# Patient Record
Sex: FEMALE | Race: WHITE | NOT HISPANIC OR LATINO | Employment: FULL TIME | URBAN - METROPOLITAN AREA
[De-identification: names, ages, dates, MRNs, and addresses within clinical notes are randomized per-mention and may not be internally consistent; named-entity substitution may affect disease eponyms.]

---

## 2020-07-17 PROCEDURE — U0003 INFECTIOUS AGENT DETECTION BY NUCLEIC ACID (DNA OR RNA); SEVERE ACUTE RESPIRATORY SYNDROME CORONAVIRUS 2 (SARS-COV-2) (CORONAVIRUS DISEASE [COVID-19]), AMPLIFIED PROBE TECHNIQUE, MAKING USE OF HIGH THROUGHPUT TECHNOLOGIES AS DESCRIBED BY CMS-2020-01-R: HCPCS | Performed by: NURSE PRACTITIONER

## 2020-07-20 ENCOUNTER — TELEPHONE (OUTPATIENT)
Dept: URGENT CARE | Facility: CLINIC | Age: 30
End: 2020-07-20

## 2020-07-20 NOTE — TELEPHONE ENCOUNTER
Results reviewed with MILADY Ceballos. Pt called and given negative covid result. Pt states she is feeling some better. Advised pt that CDC recommends 10 day home quarantine from onset of symptoms. LEISA

## 2020-09-04 ENCOUNTER — TELEPHONE (OUTPATIENT)
Dept: OBSTETRICS AND GYNECOLOGY | Facility: CLINIC | Age: 30
End: 2020-09-04

## 2020-09-04 NOTE — TELEPHONE ENCOUNTER
Patient'ds  dain called stating Michelle had cell free DNA testing done and is wanting us to check to see if the results are in and to call Michelle

## 2020-09-08 ENCOUNTER — ROUTINE PRENATAL (OUTPATIENT)
Dept: OBSTETRICS AND GYNECOLOGY | Facility: CLINIC | Age: 30
End: 2020-09-08

## 2020-09-08 VITALS — BODY MASS INDEX: 23.08 KG/M2 | DIASTOLIC BLOOD PRESSURE: 60 MMHG | WEIGHT: 143 LBS | SYSTOLIC BLOOD PRESSURE: 104 MMHG

## 2020-09-08 DIAGNOSIS — F10.21 HISTORY OF ALCOHOLISM (HCC): ICD-10-CM

## 2020-09-08 DIAGNOSIS — G25.81 RLS (RESTLESS LEGS SYNDROME): ICD-10-CM

## 2020-09-08 DIAGNOSIS — Z3A.11 11 WEEKS GESTATION OF PREGNANCY: Primary | ICD-10-CM

## 2020-09-08 LAB
GLUCOSE UR STRIP-MCNC: NEGATIVE MG/DL
PROT UR STRIP-MCNC: NEGATIVE MG/DL

## 2020-09-08 PROCEDURE — 99213 OFFICE O/P EST LOW 20 MIN: CPT | Performed by: OBSTETRICS & GYNECOLOGY

## 2020-09-08 RX ORDER — FAMOTIDINE 20 MG/1
20 TABLET, FILM COATED ORAL 2 TIMES DAILY
COMMUNITY
Start: 2020-06-10 | End: 2020-09-10

## 2020-09-08 RX ORDER — DEXTROAMPHETAMINE SACCHARATE, AMPHETAMINE ASPARTATE, DEXTROAMPHETAMINE SULFATE AND AMPHETAMINE SULFATE 7.5; 7.5; 7.5; 7.5 MG/1; MG/1; MG/1; MG/1
TABLET ORAL
COMMUNITY
Start: 2020-07-22 | End: 2020-09-10

## 2020-09-08 RX ORDER — ESOMEPRAZOLE MAGNESIUM 40 MG/1
40 CAPSULE, DELAYED RELEASE ORAL DAILY
COMMUNITY
Start: 2020-07-28 | End: 2020-09-10

## 2020-09-08 RX ORDER — PREDNISONE 20 MG/1
20 TABLET ORAL DAILY
COMMUNITY
Start: 2020-07-10 | End: 2020-09-10

## 2020-09-08 RX ORDER — DEXTROAMPHETAMINE SACCHARATE, AMPHETAMINE ASPARTATE MONOHYDRATE, DEXTROAMPHETAMINE SULFATE AND AMPHETAMINE SULFATE 7.5; 7.5; 7.5; 7.5 MG/1; MG/1; MG/1; MG/1
30 CAPSULE, EXTENDED RELEASE ORAL EVERY MORNING
COMMUNITY
Start: 2020-08-01 | End: 2020-09-10

## 2020-09-08 RX ORDER — BUPROPION HYDROCHLORIDE 100 MG/1
100 TABLET ORAL 2 TIMES DAILY
COMMUNITY
Start: 2020-07-28 | End: 2020-09-10

## 2020-09-08 RX ORDER — BUPROPION HYDROCHLORIDE 150 MG/1
150 TABLET ORAL DAILY
COMMUNITY
Start: 2020-08-24

## 2020-09-08 RX ORDER — PRAMIPEXOLE DIHYDROCHLORIDE 1 MG/1
TABLET ORAL
COMMUNITY
Start: 2020-07-28 | End: 2020-09-10

## 2020-09-08 RX ORDER — CEPHALEXIN 500 MG/1
500 CAPSULE ORAL EVERY 12 HOURS
COMMUNITY
Start: 2020-07-10 | End: 2020-09-10

## 2020-09-08 NOTE — PROGRESS NOTES
OB FOLLOW UP    Michelle Concepcion is a 30 y.o.  11w6d patient being seen today for her obstetrical follow up visit. Patient reports restless legs, nausea.  She has an appt w/neurologist on Thursday.  She would like to know if she can have deli meat - turkey and ham.    Her prenatal care is complicated by (and status) :    Patient Active Problem List   Diagnosis   • Reactive depression   • ADD (attention deficit disorder)   • Allergic rhinitis   • 11 weeks gestation of pregnancy   • History of alcoholism (CMS/HCC)   • RLS (restless legs syndrome)       ROS -   Patient Reports : nausea and occasional headaches  Patient Denies: loss of fluid, vision changes, vaginal bleeding, edema, dysuria   Fetal Movement : she feels some flutters on occasion      /60   Wt 64.9 kg (143 lb)   LMP 2020   BMI 23.08 kg/m²     Ultrasound Today: no    EXAM:  Vitals: See prenatal flowsheet   Abdomen: See prenatal flowsheet   Urine glucose/protein: See prenatal flowsheet   Pelvic: See prenatal flowsheet     Assessment    1. Pregnancy at 11w6d  2. Fetal status reassuring   3. H/o alcoholism  4. RLS    Problem List Items Addressed This Visit     11 weeks gestation of pregnancy - Primary    Relevant Orders    POC Urinalysis Dipstick    History of alcoholism (CMS/HCC)    RLS (restless legs syndrome)          Reviewed nl [id number]    Plan    1. Has f/u with Neurologist 2d for RLS  2. Asking about stress in preg/working 12 steps again - reviewed no evidence for/against.  Reviewed importance of sobriety now, also she may want to be prepared prior to having a baby so may actually be a good time.  Reviewed routine prenatal care with the office and educational materials given  Patient is on Prenatal vitamins  Follow Up: Return in about 4 weeks (around 10/6/2020).  Diet/exercise precautions  3. Follow up: 4 week(s)        Kia Menezes MD  2020

## 2020-09-10 ENCOUNTER — LAB (OUTPATIENT)
Dept: LAB | Facility: HOSPITAL | Age: 30
End: 2020-09-10

## 2020-09-10 ENCOUNTER — OFFICE VISIT (OUTPATIENT)
Dept: NEUROLOGY | Facility: CLINIC | Age: 30
End: 2020-09-10

## 2020-09-10 ENCOUNTER — TELEPHONE (OUTPATIENT)
Dept: OBSTETRICS AND GYNECOLOGY | Facility: CLINIC | Age: 30
End: 2020-09-10

## 2020-09-10 ENCOUNTER — TELEPHONE (OUTPATIENT)
Dept: NEUROLOGY | Facility: CLINIC | Age: 30
End: 2020-09-10

## 2020-09-10 VITALS
DIASTOLIC BLOOD PRESSURE: 62 MMHG | HEART RATE: 82 BPM | HEIGHT: 66 IN | OXYGEN SATURATION: 98 % | SYSTOLIC BLOOD PRESSURE: 114 MMHG | TEMPERATURE: 98.9 F | WEIGHT: 144 LBS | BODY MASS INDEX: 23.14 KG/M2

## 2020-09-10 DIAGNOSIS — G25.81 RLS (RESTLESS LEGS SYNDROME): ICD-10-CM

## 2020-09-10 DIAGNOSIS — F10.21 HISTORY OF ALCOHOLISM (HCC): ICD-10-CM

## 2020-09-10 DIAGNOSIS — Z3A.12 12 WEEKS GESTATION OF PREGNANCY: ICD-10-CM

## 2020-09-10 DIAGNOSIS — G25.81 RLS (RESTLESS LEGS SYNDROME): Primary | ICD-10-CM

## 2020-09-10 DIAGNOSIS — Z92.29 PERSONAL HISTORY OF HIGH RISK MEDICATION TREATMENT: ICD-10-CM

## 2020-09-10 PROBLEM — Z3A.11 11 WEEKS GESTATION OF PREGNANCY: Status: RESOLVED | Noted: 2020-09-08 | Resolved: 2020-09-10

## 2020-09-10 LAB
25(OH)D3 SERPL-MCNC: 28 NG/ML (ref 30–100)
ALBUMIN SERPL-MCNC: 4.4 G/DL (ref 3.5–5.2)
ALBUMIN/GLOB SERPL: 1.8 G/DL
ALP SERPL-CCNC: 55 U/L (ref 39–117)
ALT SERPL W P-5'-P-CCNC: 15 U/L (ref 1–33)
ANION GAP SERPL CALCULATED.3IONS-SCNC: 11.5 MMOL/L (ref 5–15)
AST SERPL-CCNC: 16 U/L (ref 1–32)
BILIRUB SERPL-MCNC: 0.2 MG/DL (ref 0–1.2)
BUN SERPL-MCNC: 9 MG/DL (ref 6–20)
BUN/CREAT SERPL: 16.7 (ref 7–25)
CALCIUM SPEC-SCNC: 9.3 MG/DL (ref 8.6–10.5)
CHLORIDE SERPL-SCNC: 103 MMOL/L (ref 98–107)
CK SERPL-CCNC: 65 U/L (ref 20–180)
CO2 SERPL-SCNC: 23.5 MMOL/L (ref 22–29)
CREAT SERPL-MCNC: 0.54 MG/DL (ref 0.57–1)
FERRITIN SERPL-MCNC: 27.3 NG/ML (ref 13–150)
FOLATE SERPL-MCNC: >20 NG/ML (ref 4.78–24.2)
GFR SERPL CREATININE-BSD FRML MDRD: 133 ML/MIN/1.73
GFR SERPL CREATININE-BSD FRML MDRD: >150 ML/MIN/1.73
GLOBULIN UR ELPH-MCNC: 2.4 GM/DL
GLUCOSE SERPL-MCNC: 77 MG/DL (ref 65–99)
POTASSIUM SERPL-SCNC: 4.3 MMOL/L (ref 3.5–5.2)
PROT SERPL-MCNC: 6.8 G/DL (ref 6–8.5)
SODIUM SERPL-SCNC: 138 MMOL/L (ref 136–145)
T-UPTAKE NFR SERPL: 1.32 TBI (ref 0.8–1.3)
T4 SERPL-MCNC: 6.8 MCG/DL (ref 4.5–11.7)
TSH SERPL DL<=0.05 MIU/L-ACNC: 2.35 UIU/ML (ref 0.27–4.2)
VIT B12 BLD-MCNC: 759 PG/ML (ref 211–946)

## 2020-09-10 PROCEDURE — 83921 ORGANIC ACID SINGLE QUANT: CPT

## 2020-09-10 PROCEDURE — 82607 VITAMIN B-12: CPT

## 2020-09-10 PROCEDURE — 82746 ASSAY OF FOLIC ACID SERUM: CPT

## 2020-09-10 PROCEDURE — 36415 COLL VENOUS BLD VENIPUNCTURE: CPT

## 2020-09-10 PROCEDURE — 82728 ASSAY OF FERRITIN: CPT

## 2020-09-10 PROCEDURE — 84479 ASSAY OF THYROID (T3 OR T4): CPT

## 2020-09-10 PROCEDURE — 99214 OFFICE O/P EST MOD 30 MIN: CPT | Performed by: NURSE PRACTITIONER

## 2020-09-10 PROCEDURE — 84443 ASSAY THYROID STIM HORMONE: CPT

## 2020-09-10 PROCEDURE — 82550 ASSAY OF CK (CPK): CPT

## 2020-09-10 PROCEDURE — 82306 VITAMIN D 25 HYDROXY: CPT

## 2020-09-10 PROCEDURE — 84436 ASSAY OF TOTAL THYROXINE: CPT

## 2020-09-10 PROCEDURE — 80053 COMPREHEN METABOLIC PANEL: CPT

## 2020-09-10 PROCEDURE — 80307 DRUG TEST PRSMV CHEM ANLYZR: CPT

## 2020-09-10 RX ORDER — MAGNESIUM OXIDE 400 MG/1
400 TABLET ORAL NIGHTLY
Qty: 90 TABLET | Refills: 3 | Status: SHIPPED | OUTPATIENT
Start: 2020-09-10

## 2020-09-10 RX ORDER — CARBIDOPA/LEVODOPA 25MG-250MG
1 TABLET ORAL 4 TIMES DAILY
Qty: 120 TABLET | Refills: 2 | Status: SHIPPED | OUTPATIENT
Start: 2020-09-10 | End: 2021-09-10

## 2020-09-10 NOTE — TELEPHONE ENCOUNTER
Iram if you will let the patient know that I spoke to to have her neurologist and they recommended an increasing her carbidopa-levodopa dose and having her take this 4 times a day initially.  I have gone ahead and sent in this new prescription to her pharmacy.  We had [facility] in [place] as the patient's pharmacy of choice so that is where I sent the medication.  Also I have placed a call to Dr. Menezes her OB to discuss of this treatment as well as the magnesium and other treatment options.    Iram please see below and call their office.  I have already forwarded my message to Dr. Menezes as well.  I just need to speak with her or her nurse to discuss the plan.  Again above messages for patient only and then below messages for you to call her OB provider.  Thanks, MILADY Samson.

## 2020-09-10 NOTE — TELEPHONE ENCOUNTER
She is off meds for Restless Legs - She had occ taking a 1/4 tablet Mirapex and Neurology did not agree with that and Dr. Menezes could not saw it was safe. This is her first pregnancy and she is worried this could have harmed the baby. Moving forward, do not take that and she has some options with Neurology that may work.

## 2020-09-10 NOTE — TELEPHONE ENCOUNTER
Can you call Dr. Ann Menezes's office with OB/GYN. Saw patient today for Restless leg syndrome. Patient is 12 weeks pregnant.     I need to discuss with her nurse or provider about what options could be safe with pregnancy.     I spoke with Dr. Mendieta one of our Neurologist and he recommended first increasing her sinemet to 4 times a day dosing and I will send this to the pharmacy. If she is not better in 4 weeks, the following additional options would be possible.    1. low dose clonazepam at night only during pregnancy with monthly follow up and drug screens   2. or a low dose tylenol #3 qhs.     I am cautious with controlled substances and her alcohol history. The other option would be a low dose gabapentin at night. I did discuss her case with Dr. York our Neurologist today too and these were his recommendations.     I have ordered labs as well as drug screen.     Let me know when they call back so I can speak with them directly.     Thanks, MILADY Samson    I am going to send message to OB as well through [employer]. Patient has already called there today in tears about her appointment with us.

## 2020-09-10 NOTE — TELEPHONE ENCOUNTER
Patient was referred to a neurologist by us and had the appt today. She is calling because she's upset after the visit, is in tears. Didn't want to pry for details, she was just trying to keep together enough to give me her information.

## 2020-09-10 NOTE — PROGRESS NOTES
"Subjective:     Patient ID: Michelle Concepcion is a 30 y.o. female.    CC:   Chief Complaint   Patient presents with   • restless leg syndrome       HPI:   History of Present Illness   This is a 30-year-old female who presents to establish care with neurology for severe restless leg syndrome present since childhood.  She does tell me that her dad also has restless leg syndrome.  She is currently 12 weeks pregnant.  She is followed by Dr. Menezes OB.  Prior to pregnancy she was on Mirapex 1 mg nightly and this was working very well for her.  Since pregnancy she has stopped this and tells me due to desperation she has taken a half a pill when she has severe symptoms.  She has recently been prescribed carbidopa-levodopa  mg 1 p.o. 3 times daily and then tells me recently this was increased to 2 pills 3 times a day.  She tells me this helps for symptoms during the day but at night it severe and she tells me at times \"I just want to cut my legs off\" while in tears.  She did have labs with her OB on 8/12/2020 hemoglobin 12.7, hematocrit 39.2, platelets 329 and urine drug screen normal.  She does have a history of alcoholism.  She tells me prior to getting treatment for her restless legs she would just drink herself to sleep but since being off alcohol her symptoms have been even worse.  She also tells me that she has cut out almost all caffeine and has none after lunchtime to try and help.  She is very physically active and exercising to try to help with her symptoms as well. She tells me she has been on antidepressants.  She was on Adderall as well as Seroquel and Wellbutrin prior to pregnancy.  She is back on the Wellbutrin, prenatal vitamin and the Sinemet.  She is desperate for relief of her restless leg symptoms.  She has not been treated by neurology for her RLS.  She has not had a ferritin level checked.  She is again very tearful and does not know how she will go through this pregnancy with the severity of " symptoms.  She tells me when she starts to lay down she has to move her legs constantly and cannot get any sleep.  Her symptoms have been severe since being off the Mirapex.    The following portions of the patient's history were reviewed and updated as appropriate: allergies, current medications, past family history, past medical history, past social history, past surgical history and problem list.    Past Medical History:   Diagnosis Date   • 11 weeks gestation of pregnancy 9/8/2020   • ADD (attention deficit disorder)    • Alcoholism (CMS/HCC)    • Anxiety    • Depression    • Restless leg syndrome        Past Surgical History:   Procedure Laterality Date   • EAR TUBES         Social History     Socioeconomic History   • Marital status:      Spouse name: Not on file   • Number of children: Not on file   • Years of education: Not on file   • Highest education level: Not on file   Tobacco Use   • Smoking status: Never Smoker   • Smokeless tobacco: Never Used   Substance and Sexual Activity   • Alcohol use: Not Currently     Comment: recovering alcoholic   • Drug use: Not Currently   • Sexual activity: Yes     Partners: Male       Family History   Problem Relation Age of Onset   • Multiple sclerosis Mother    • No Known Problems Father    • Ovarian cancer Other    • Stroke Maternal Grandmother         Review of Systems   Constitutional: Negative for chills, fatigue, fever and unexpected weight change.   HENT: Negative for ear pain, hearing loss, nosebleeds, rhinorrhea and sore throat.    Eyes: Negative for photophobia, pain, discharge, itching and visual disturbance.   Respiratory: Negative for cough, chest tightness, shortness of breath and wheezing.    Cardiovascular: Negative for chest pain, palpitations and leg swelling.   Gastrointestinal: Negative for abdominal pain, blood in stool, constipation, diarrhea, nausea and vomiting.   Genitourinary: Negative for dysuria, frequency, hematuria and urgency.  "  Musculoskeletal: Negative for arthralgias, back pain, gait problem, joint swelling, myalgias, neck pain and neck stiffness.   Skin: Negative for rash and wound.   Allergic/Immunologic: Negative for environmental allergies and food allergies.   Neurological: Negative for dizziness, tremors, seizures, syncope, speech difficulty, weakness, light-headedness, numbness and headaches.   Hematological: Negative for adenopathy. Does not bruise/bleed easily.   Psychiatric/Behavioral: Negative for agitation, confusion, decreased concentration, hallucinations, sleep disturbance and suicidal ideas. The patient is not nervous/anxious.    All other systems reviewed and are negative.       Objective:  /62   Pulse 82   Temp 98.9 °F (37.2 °C)   Ht 167.6 cm (66\")   Wt 65.3 kg (144 lb)   LMP 06/17/2020   SpO2 98%   BMI 23.24 kg/m²     Neurologic Exam     Mental Status   Oriented to person, place, and time.   Registration: recalls 3 of 3 objects. Recall at 5 minutes: recalls 3 of 3 objects. Follows 3 step commands.   Attention: normal. Concentration: normal.   Speech: speech is normal   Level of consciousness: alert  Knowledge: good and consistent with education. Able to perform simple calculations.   Able to name object. Able to read. Able to repeat. Able to write. Normal comprehension.     Cranial Nerves   Cranial nerves II through XII intact.     Motor Exam   Muscle bulk: normal  Overall muscle tone: normal    Strength   Strength 5/5 throughout.     Sensory Exam   Light touch normal.   Vibration normal.   Proprioception normal.   Pinprick normal.     Gait, Coordination, and Reflexes     Gait  Gait: normal    Coordination   Romberg: negative  Finger to nose coordination: normal  Heel to shin coordination: normal    Tremor   Resting tremor: absent  Intention tremor: absent  Action tremor: absent    Reflexes   Right brachioradialis: 2+  Left brachioradialis: 2+  Right biceps: 2+  Left biceps: 2+  Right triceps: 2+  Left " triceps: 2+  Right patellar: 2+  Left patellar: 2+  Right achilles: 2+  Left achilles: 2+  Right : 2+  Left : 2+  Right plantar: normal  Left plantar: normal  Right Hahn: absent  Left Hahn: absent  Right ankle clonus: absent  Left ankle clonus: absent      Physical Exam   Constitutional: She is oriented to person, place, and time. She appears well-developed and well-nourished.   Cardiovascular: Normal rate, regular rhythm, S1 normal, S2 normal and normal heart sounds.   Pulmonary/Chest: Effort normal and breath sounds normal.   Neurological: She is oriented to person, place, and time. She has normal strength. She has a normal Finger-Nose-Finger Test, a normal Heel to Peng Test and a normal Romberg Test. Gait normal.   Reflex Scores:       Tricep reflexes are 2+ on the right side and 2+ on the left side.       Bicep reflexes are 2+ on the right side and 2+ on the left side.       Brachioradialis reflexes are 2+ on the right side and 2+ on the left side.       Patellar reflexes are 2+ on the right side and 2+ on the left side.       Achilles reflexes are 2+ on the right side and 2+ on the left side.  Psychiatric: Her speech is normal and behavior is normal. Judgment and thought content normal. Her mood appears anxious (tearful). Cognition and memory are normal.       Assessment/Plan:       Michelle was seen today for restless leg syndrome.    Diagnoses and all orders for this visit:    RLS (restless legs syndrome)  -     Vitamin D 25 Hydroxy; Future  -     Vitamin B12 & Folate; Future  -     Thyroid Panel With TSH; Future  -     Methylmalonic Acid, Serum; Future  -     Ferritin; Future  -     CK; Future  -     Comprehensive Metabolic Panel; Future  -     magnesium oxide (MAG-OX) 400 MG tablet; Take 1 tablet by mouth Every Night.  -     carbidopa-levodopa (Sinemet)  MG per tablet; Take 1 tablet by mouth 4 (Four) Times a Day.    12 weeks gestation of pregnancy  -     Vitamin D 25 Hydroxy; Future  -      Vitamin B12 & Folate; Future  -     Thyroid Panel With TSH; Future  -     Methylmalonic Acid, Serum; Future  -     Ferritin; Future  -     CK; Future  -     Comprehensive Metabolic Panel; Future  -     magnesium oxide (MAG-OX) 400 MG tablet; Take 1 tablet by mouth Every Night.  -     carbidopa-levodopa (Sinemet)  MG per tablet; Take 1 tablet by mouth 4 (Four) Times a Day.    Personal history of high risk medication treatment  Comments:  drug screen baseline today  Orders:  -     Drug Screen (10), Serum; Future    History of alcoholism (CMS/HCC)           I did have a long discussion with the patient today about the complexity of treating RLS with pregnancy.  I have discussed her care with 2 of our neurologist and collaborating physicians.  I will also discuss with her OB and call her with the best plan of treatment.  She does verbalize understanding.  We are going to follow-up in 4 weeks to reevaluate.  Discussed her case in detail with Dr. Matthew Mendieta one of our Neurologists as well as Dr. Jovany York my collaborating neurologist. Dr. Mendieta recommends adding a 4th dose of sinemet at bedtime and I will send this in to the pharmacy and notify the patient of this plan. If the increased sinemet dosage is not beneficial, we will plan to f/u in 4 weeks with lab review and possible low dose clonazepam QHS if ok with OB. I am using great caution with her alcoholism history and this may be best treated with Psychiatry but for now we will manage her. I have notified patient due to the complexity with being pregnant, I needed to discuss her treatment options with Dr. Menezes and then we would call her with a plan. She verbalized understanding. There is insufficient data for dopamine agonists for RLS in pregnancy and is not recommended.  I did go ahead and add magnesium.    Reviewed medications, potential side effects and signs and symptoms to report. Discussed risk versus benefits of treatment plan with patient  and/or family-including medications, labs and radiology that may be ordered. Addressed questions and concerns during visit. Patient and/or family verbalized understanding and agree with plan.    AS THE PROVIDER, I PERSONALLY WORE PPE DURING ENTIRE FACE TO FACE ENCOUNTER IN CLINIC WITH THE PATIENT. PATIENT ALSO WORE PPE DURING ENTIRE FACE TO FACE ENCOUNTER EXCEPT FOR A MAX OF 30 SECONDS DURING NEUROLOGICAL EVALUATION OF CRANIAL NERVES AND THEN MASK WAS PLACED BACK OVER PATIENT FACE FOR REMAINDER OF VISIT. I WASHED MY HANDS BEFORE AND AFTER VISIT.    During this visit the following were done:  Labs Reviewed [x]    Labs Ordered [x]    Radiology Reports Reviewed []    Radiology Ordered []    PCP Records Reviewed []    Referring Provider Records Reviewed [x]  OB notes and labs  ER Records Reviewed []    Hospital Records Reviewed []    History Obtained From Family []    Radiology Images Reviewed []    Other Reviewed [x]    Records Requested []      Alanna Vargas, MILADY  9/10/2020

## 2020-09-11 ENCOUNTER — TELEPHONE (OUTPATIENT)
Dept: OBSTETRICS AND GYNECOLOGY | Facility: CLINIC | Age: 30
End: 2020-09-11

## 2020-09-11 ENCOUNTER — TELEPHONE (OUTPATIENT)
Dept: NEUROLOGY | Facility: CLINIC | Age: 30
End: 2020-09-11

## 2020-09-11 NOTE — TELEPHONE ENCOUNTER
Received call from patient who stated that the new prescription called in was a lower dose then she was already on. She is currently taking 2 pills 3 times a day which is not really working.     The new prescription is 1 pill 4 times a day so therefore it is not going to help her at all.     Can someone contact patient and advise what we can do?     The 2 pills 3 times a day does not help at night so she is not sure what can be done.     [phone]

## 2020-09-11 NOTE — TELEPHONE ENCOUNTER
Sounds good. Thank you for calling the patient and Dr. Menezes. Let me know when OB office calls back and what she approves so we can notify the patient. Thanks, MILADY Samson

## 2020-09-11 NOTE — TELEPHONE ENCOUNTER
----- Message from MILADY Auguste sent at 9/11/2020  8:01 AM EDT -----  Please notify the patient that her vitamin D level is in the low normal range at 28 and I would recommend she continue her prenatal vitamin and the consider adding a vitamin D 2000 units daily in addition.  Vitamin B12 level and folic acid level are great.  Her thyroid levels look normal.  Her ferritin level which is a source of iron is in the low normal range at 27 with normal being .  She could discuss with her OB about taking an iron supplement in addition to her prenatal vitamin.  Her muscle enzyme levels are completely normal.  Her kidney and liver function look excellent.  We will notify her of additional labs as they are available.  Thanks, MILADY Samson.    Please fax lab results to her OB Dr. Ann Menezes.

## 2020-09-11 NOTE — PROGRESS NOTES
Please notify the patient that her vitamin D level is in the low normal range at 28 and I would recommend she continue her prenatal vitamin and the consider adding a vitamin D 2000 units daily in addition.  Vitamin B12 level and folic acid level are great.  Her thyroid levels look normal.  Her ferritin level which is a source of iron is in the low normal range at 27 with normal being .  She could discuss with her OB about taking an iron supplement in addition to her prenatal vitamin.  Her muscle enzyme levels are completely normal.  Her kidney and liver function look excellent.  We will notify her of additional labs as they are available.  Thanks, MILADY Samson.    Please fax lab results to her OB Dr. Ann Menezes.

## 2020-09-11 NOTE — TELEPHONE ENCOUNTER
I SPOKE WITH DR. PANTOJA OFFICE AND REQUESTED A CALL BACK TO DISCUSS THE PT MEDICATIONS AND PT IS OKAY WITH THE MEDICATION INCREASE.

## 2020-09-11 NOTE — TELEPHONE ENCOUNTER
Brooke RN at Dr. Menezes's office called me back and I discussed possible treatment plans for patient with severe RLS. I did explained we already increased her sinemet to 4 times a day dosing and will follow up in 4 weeks to re-eval. She will send message to Dr. Menezes to see if low dose clonazepam would be safe during pregnancy if sinemet does not work. She will call me next week to notify me of OB decision and advice. TRACE Walden. Send message back to me so I can follow up next week. Thanks.

## 2020-09-11 NOTE — TELEPHONE ENCOUNTER
The patient's OB/GYN had documented that she had prescribed the Sinemet  mg 2 pills 3 times a day.  I sent in a higher form of the  mg 4 times a day.  This is a higher dosage and should help with her symptoms.  I am not sure if she realized that I actually increase the entire dosage of the tablet overall.  I went ahead and called the patient myself and spoke by phone with her, explained the plan and all. She didn't realize it was a different dose. She said pharmacy won't let her fill new dose yet so I told her to take the lower dose sinemet 2 pills QID until she can get the higher dose filled which will be 1 pill QID. She tells me she is doing everything she can to get sleep. She and her  walked 1 mile at 4am this morning when her meds wore off. I explained ti can take a week or 2 to help and we expect a call back from Dr. Menezes next Monday and can let her know any updates on plan. She verbalizes understanding and is very thankful for the call. Thanks, LAURA Samson keep this note open so we can ensure we follow up Monday with OB if we don't hear from them.

## 2020-09-12 LAB
AMPHETAMINES SERPL QL SCN: NEGATIVE NG/ML
BARBITURATES SERPLBLD QL: NEGATIVE UG/ML
BENZODIAZ SERPL QL: NEGATIVE NG/ML
BZE BLD QL SCN: NEGATIVE NG/ML
METHADONE UR QL: NEGATIVE NG/ML
OPIATES SERPL QL SCN: NEGATIVE NG/ML
OXYCODONE SERPL-MCNC: NEGATIVE NG/ML
PCP SPEC-MCNC: NEGATIVE NG/ML
PROPOXYPH SPEC QL: NEGATIVE NG/ML
THC SERPLBLD CFM-MCNC: NEGATIVE NG/ML

## 2020-09-14 ENCOUNTER — TELEPHONE (OUTPATIENT)
Dept: NEUROLOGY | Facility: CLINIC | Age: 30
End: 2020-09-14

## 2020-09-14 ENCOUNTER — TELEPHONE (OUTPATIENT)
Dept: OBSTETRICS AND GYNECOLOGY | Facility: CLINIC | Age: 30
End: 2020-09-14

## 2020-09-14 LAB
Lab: NORMAL
METHYLMALONATE SERPL-SCNC: 119 NMOL/L (ref 0–378)

## 2020-09-14 NOTE — TELEPHONE ENCOUNTER
----- Message from Michelle Concepcion sent at 9/13/2020  6:56 AM EDT -----  Regarding: Visit Follow-Up Question  Contact: [phone]  https://www.ncbi.nlm.nih.gov/pmc/articles/RUB1337173/    (Pertaining to my previous message about ferritin levels; I think the full link may have gotten cut off)    Thank you for sharing this article. Ferritin levels are always supposed to be checked in patient's with Restless leg syndrome, but generally this is not checked by a primary care and normally only by neurologist or sleep specialist. The ferritin levels would definitely be contributing to your restless leg symptoms. I believe your OB will allow you to have additional iron supplement because often pregnant women can have low iron stores. I am waiting to hear back from them about alternative approvals for your RLS treatment too. The additional iron should help your RLS too but can take 4-6 weeks to see improvement. Thanks, Alanna

## 2020-09-14 NOTE — TELEPHONE ENCOUNTER
----- Message from Michelle Concepcion sent at 9/13/2020  6:52 AM EDT -----  Regarding: Test Results Question  Contact: [phone]  Alanna—I just wanted to share this link with you (although I’m sure you’ve probably seen it and obviously know way more than I do), but I found it extremely interesting and it’s made me think my low ferritin levels may have been the source of a lot of my issues throughout life that were treated as something else. I just wanted to thank you for having me tested for something I didn’t even know was relevant, but obviously is extremely important. I’ve experienced so many of these iron deficiency symptoms: restless legs syndrome, fatigue, inability to concentrate, etc. I hope I’m able to take an iron supplement with my pregnancy because I think it might improve my symptoms drastically. https://www.ncbi.nlm.nih.gov/pmc/articles/EYK0647271/

## 2020-09-14 NOTE — TELEPHONE ENCOUNTER
I CALLED AND LET PT KNOW WE HAVE LVM WITH OBGYN AND AS SOON AS ILEANA IS ABLE TO SPEAK WITH OBGYN WE WILL GIVE HER A CALL WITH AN UPDATE ON THE MEDICATIONS UPDATES OR CHANGES AS THEY MAY BE.

## 2020-09-14 NOTE — TELEPHONE ENCOUNTER
PT CALLED WANTING TO KNOW IF ILEANA HAD GOTTEN THE CHANCE TO SPEAK TO HER OB YET. I DID LET HER KNOW PER PREVIOUS NOTE, ILEANA WAS WAITING TO HEAR BACK FROM THEM TO DISCUSS ALTERNATIVE APPROVALS FOR RLS TREATMENT BUT TOLD PT I WOULD SEND OVER A MESSAGE. SHE STATED UNDERSTANDING            CALL BACK- [phone]

## 2020-09-16 NOTE — TELEPHONE ENCOUNTER
Please notify patient-I was able to hear back from Dr. Menezes, the patient's OB, and she is going to refer her to a High Risk OB specialist to see if they have additional ideas to manage her RLS in this current stage with being pregnant. She is ok with the iron and the sinemet, but the additional options she would prefer we not use at this time due to limited data in pregnancy. We will see what High Risk OB can offer. I am hopeful the sinemet and the iron will help during this time. Thanks, MILADY Samson

## 2020-09-21 ENCOUNTER — TELEPHONE (OUTPATIENT)
Dept: NEUROLOGY | Facility: CLINIC | Age: 30
End: 2020-09-21

## 2020-09-21 NOTE — TELEPHONE ENCOUNTER
Practicing in general neurology we do not feel comfortable increasing the frequency of the medication above 4 times per day.  I also discussed with 1 of our other neurologists Dr. Mendieta and he would not recommend increasing the frequency of the medication due to her pregnancy.  If she would like to be referred to [facility] for possible additional treatments that are out of the scope of our general neurology practice we would be happy to refer her.  It was my understanding that the OB was going to refer her to a high risk OB as well.  Unfortunately we do not have additional recommendations at this time with her pregnancy there is not enough data to increase the medication.  I apologize for any inconvenience or discomfort the patient is experiencing at this time.

## 2020-09-21 NOTE — TELEPHONE ENCOUNTER
----- Message from Michelle Concepcion sent at 9/21/2020  3:51 PM EDT -----  Regarding: Prescription Question  Contact: [phone]  Is my current prescription the highest in strength I can use while pregnant? Would I be able to safely take 6 per day instead of 4? My problem is that these pills only relieve my RLS for 3 hours at a time. I have RLS 24/7. Please let me know & thank you.

## 2020-09-22 ENCOUNTER — TELEPHONE (OUTPATIENT)
Dept: NEUROLOGY | Facility: CLINIC | Age: 30
End: 2020-09-22

## 2020-09-22 DIAGNOSIS — Z34.90 PREGNANCY, UNSPECIFIED GESTATIONAL AGE: ICD-10-CM

## 2020-09-22 DIAGNOSIS — G25.81 RLS (RESTLESS LEGS SYNDROME): Primary | ICD-10-CM

## 2020-09-22 DIAGNOSIS — Z3A.14 14 WEEKS GESTATION OF PREGNANCY: Primary | ICD-10-CM

## 2020-09-22 NOTE — TELEPHONE ENCOUNTER
----- Message from Michelle Concepcion sent at 9/22/2020 11:07 AM EDT -----  Regarding: RE: Prescription Question  Contact: [phone]  I would very much appreciate a referral to [facility]. I am a long time patient at  and am more than likely switching my OBGYN care to  as well. With respect, [facility] has been a disappointment in regard to their (lack of) sense of urgency. I was very happy with my care at your office. I really appreciate your help and timely responses, but understand my situation is beyond your scope of practice. I’ll look forward to hearing from . If someone could confirm that the referral will be sent I would appreciate it. Thanks again.

## 2020-09-23 NOTE — TELEPHONE ENCOUNTER
Keshawn Martinez, I want you to get relief of your RLS and be able to enjoy your pregnancy. The only issue with  is that it can take time to get in in a timely manner. But I am placing the referral as Urgent due to your pregnancy. We are happy to continue to provide refills and care until you receive your appointment with them. We wish you the very best. I wish there were more date on women with RLS in pregnancy. Thanks, Alanna

## 2020-10-03 ENCOUNTER — TELEPHONE (OUTPATIENT)
Dept: URGENT CARE | Facility: CLINIC | Age: 30
End: 2020-10-03

## 2020-10-03 PROCEDURE — U0003 INFECTIOUS AGENT DETECTION BY NUCLEIC ACID (DNA OR RNA); SEVERE ACUTE RESPIRATORY SYNDROME CORONAVIRUS 2 (SARS-COV-2) (CORONAVIRUS DISEASE [COVID-19]), AMPLIFIED PROBE TECHNIQUE, MAKING USE OF HIGH THROUGHPUT TECHNOLOGIES AS DESCRIBED BY CMS-2020-01-R: HCPCS | Performed by: NURSE PRACTITIONER

## 2020-10-03 NOTE — TELEPHONE ENCOUNTER
Patient called and notified that her strep test was done instead of a flu test.  Strep test was negative.  Patient notified that she can come by to get a flu swab if she so desires.  Patient states that she lives in [place] and is already driving home and does not think she will make it back to day but might try to come tomorrow.  Education done and questions answered.

## 2020-10-06 ENCOUNTER — TELEPHONE (OUTPATIENT)
Dept: URGENT CARE | Facility: CLINIC | Age: 30
End: 2020-10-06

## 2020-10-06 ENCOUNTER — OFFICE VISIT (OUTPATIENT)
Dept: OBSTETRICS AND GYNECOLOGY | Facility: HOSPITAL | Age: 30
End: 2020-10-06

## 2020-10-06 ENCOUNTER — HOSPITAL ENCOUNTER (OUTPATIENT)
Dept: WOMENS IMAGING | Facility: HOSPITAL | Age: 30
Discharge: HOME | End: 2020-10-06
Admitting: OBSTETRICS & GYNECOLOGY

## 2020-10-06 VITALS — BODY MASS INDEX: 24.73 KG/M2 | DIASTOLIC BLOOD PRESSURE: 74 MMHG | WEIGHT: 153.2 LBS | SYSTOLIC BLOOD PRESSURE: 125 MMHG

## 2020-10-06 DIAGNOSIS — G25.81 RLS (RESTLESS LEGS SYNDROME): ICD-10-CM

## 2020-10-06 DIAGNOSIS — Z92.29 PERSONAL HISTORY OF HIGH RISK MEDICATION TREATMENT: ICD-10-CM

## 2020-10-06 DIAGNOSIS — F32.9 REACTIVE DEPRESSION: Primary | ICD-10-CM

## 2020-10-06 DIAGNOSIS — Z3A.14 14 WEEKS GESTATION OF PREGNANCY: ICD-10-CM

## 2020-10-06 DIAGNOSIS — F98.8 ATTENTION DEFICIT DISORDER, UNSPECIFIED HYPERACTIVITY PRESENCE: ICD-10-CM

## 2020-10-06 PROCEDURE — 99241 PR OFFICE CONSULTATION NEW/ESTAB PATIENT 15 MIN: CPT | Performed by: OBSTETRICS & GYNECOLOGY

## 2020-10-06 PROCEDURE — 76815 OB US LIMITED FETUS(S): CPT

## 2020-10-06 PROCEDURE — 76815 OB US LIMITED FETUS(S): CPT | Performed by: OBSTETRICS & GYNECOLOGY

## 2020-10-06 RX ORDER — PRAMIPEXOLE DIHYDROCHLORIDE 1 MG/1
1 TABLET ORAL NIGHTLY
COMMUNITY

## 2020-10-06 NOTE — PROGRESS NOTES
Documentation of the ultasound findings, images, and interpretations as well as consultation note will be available in the patient's Viewpoint report located in the Chart Review Imaging tab in [employer].

## 2020-10-06 NOTE — PROGRESS NOTES
Pt has severe restless leg syndrome. Has questions about medications during pregnancy. Sees neurologist at , Dr.Daniel Bartlett. Next appt next month. Reports normal NIPT results. Switched PNC to  at . Next OB appt 10/28/20.

## 2022-07-11 ENCOUNTER — HOSPITAL ENCOUNTER (EMERGENCY)
Facility: HOSPITAL | Age: 32
Discharge: HOME | End: 2022-07-12
Attending: EMERGENCY MEDICINE | Admitting: EMERGENCY MEDICINE

## 2022-07-11 ENCOUNTER — APPOINTMENT (OUTPATIENT)
Dept: CT IMAGING | Facility: HOSPITAL | Age: 32
End: 2022-07-11

## 2022-07-11 VITALS
BODY MASS INDEX: 21.38 KG/M2 | HEART RATE: 102 BPM | TEMPERATURE: 98.7 F | SYSTOLIC BLOOD PRESSURE: 116 MMHG | HEIGHT: 66 IN | OXYGEN SATURATION: 100 % | WEIGHT: 133 LBS | RESPIRATION RATE: 16 BRPM | DIASTOLIC BLOOD PRESSURE: 81 MMHG

## 2022-07-11 DIAGNOSIS — F32.0 CURRENT MILD EPISODE OF MAJOR DEPRESSIVE DISORDER WITHOUT PRIOR EPISODE: ICD-10-CM

## 2022-07-11 DIAGNOSIS — R51.9 VIRAL CEPHALGIA: Primary | ICD-10-CM

## 2022-07-11 DIAGNOSIS — B34.9 VIRAL CEPHALGIA: Primary | ICD-10-CM

## 2022-07-11 DIAGNOSIS — U07.1 COVID-19: ICD-10-CM

## 2022-07-11 LAB
HOLD SPECIMEN: NORMAL
WHOLE BLOOD HOLD COAG: NORMAL
WHOLE BLOOD HOLD SPECIMEN: NORMAL

## 2022-07-11 PROCEDURE — 99283 EMERGENCY DEPT VISIT LOW MDM: CPT

## 2022-07-11 PROCEDURE — 85025 COMPLETE CBC W/AUTO DIFF WBC: CPT | Performed by: EMERGENCY MEDICINE

## 2022-07-11 PROCEDURE — 96365 THER/PROPH/DIAG IV INF INIT: CPT

## 2022-07-11 PROCEDURE — 70450 CT HEAD/BRAIN W/O DYE: CPT

## 2022-07-11 PROCEDURE — 96375 TX/PRO/DX INJ NEW DRUG ADDON: CPT

## 2022-07-11 PROCEDURE — 86140 C-REACTIVE PROTEIN: CPT | Performed by: EMERGENCY MEDICINE

## 2022-07-11 PROCEDURE — 80053 COMPREHEN METABOLIC PANEL: CPT | Performed by: EMERGENCY MEDICINE

## 2022-07-11 PROCEDURE — 85652 RBC SED RATE AUTOMATED: CPT | Performed by: EMERGENCY MEDICINE

## 2022-07-11 PROCEDURE — 0202U NFCT DS 22 TRGT SARS-COV-2: CPT | Performed by: EMERGENCY MEDICINE

## 2022-07-11 RX ORDER — SODIUM CHLORIDE 0.9 % (FLUSH) 0.9 %
10 SYRINGE (ML) INJECTION AS NEEDED
Status: DISCONTINUED | OUTPATIENT
Start: 2022-07-11 | End: 2022-07-12 | Stop reason: HOSPADM

## 2022-07-11 RX ORDER — DIPHENHYDRAMINE HYDROCHLORIDE 50 MG/ML
12.5 INJECTION INTRAMUSCULAR; INTRAVENOUS ONCE
Status: DISCONTINUED | OUTPATIENT
Start: 2022-07-11 | End: 2022-07-12 | Stop reason: HOSPADM

## 2022-07-11 RX ORDER — METOCLOPRAMIDE HYDROCHLORIDE 5 MG/ML
5 INJECTION INTRAMUSCULAR; INTRAVENOUS ONCE
Status: DISCONTINUED | OUTPATIENT
Start: 2022-07-11 | End: 2022-07-12 | Stop reason: HOSPADM

## 2022-07-12 LAB
ALBUMIN SERPL-MCNC: 2.9 G/DL (ref 3.5–5.2)
ALBUMIN/GLOB SERPL: 1.4 G/DL
ALP SERPL-CCNC: 53 U/L (ref 39–117)
ALT SERPL W P-5'-P-CCNC: 9 U/L (ref 1–33)
ANION GAP SERPL CALCULATED.3IONS-SCNC: 9 MMOL/L (ref 5–15)
AST SERPL-CCNC: 14 U/L (ref 1–32)
B PARAPERT DNA SPEC QL NAA+PROBE: NOT DETECTED
B PERT DNA SPEC QL NAA+PROBE: NOT DETECTED
BASOPHILS # BLD AUTO: 0.02 10*3/MM3 (ref 0–0.2)
BASOPHILS NFR BLD AUTO: 0.9 % (ref 0–1.5)
BILIRUB SERPL-MCNC: <0.2 MG/DL (ref 0–1.2)
BUN SERPL-MCNC: 8 MG/DL (ref 6–20)
BUN/CREAT SERPL: 12.1 (ref 7–25)
C PNEUM DNA NPH QL NAA+NON-PROBE: NOT DETECTED
CALCIUM SPEC-SCNC: 6.7 MG/DL (ref 8.6–10.5)
CHLORIDE SERPL-SCNC: 115 MMOL/L (ref 98–107)
CO2 SERPL-SCNC: 20 MMOL/L (ref 22–29)
CREAT SERPL-MCNC: 0.66 MG/DL (ref 0.57–1)
CRP SERPL-MCNC: 0.93 MG/DL (ref 0–0.5)
DEPRECATED RDW RBC AUTO: 37.7 FL (ref 37–54)
EGFRCR SERPLBLD CKD-EPI 2021: 119.7 ML/MIN/1.73
EOSINOPHIL # BLD AUTO: 0.04 10*3/MM3 (ref 0–0.4)
EOSINOPHIL NFR BLD AUTO: 1.9 % (ref 0.3–6.2)
ERYTHROCYTE [DISTWIDTH] IN BLOOD BY AUTOMATED COUNT: 11.7 % (ref 12.3–15.4)
ERYTHROCYTE [SEDIMENTATION RATE] IN BLOOD: <1 MM/HR (ref 0–20)
FLUAV SUBTYP SPEC NAA+PROBE: NOT DETECTED
FLUBV RNA ISLT QL NAA+PROBE: NOT DETECTED
GLOBULIN UR ELPH-MCNC: 2.1 GM/DL
GLUCOSE SERPL-MCNC: 101 MG/DL (ref 65–99)
HADV DNA SPEC NAA+PROBE: NOT DETECTED
HCOV 229E RNA SPEC QL NAA+PROBE: NOT DETECTED
HCOV HKU1 RNA SPEC QL NAA+PROBE: NOT DETECTED
HCOV NL63 RNA SPEC QL NAA+PROBE: NOT DETECTED
HCOV OC43 RNA SPEC QL NAA+PROBE: NOT DETECTED
HCT VFR BLD AUTO: 35.9 % (ref 34–46.6)
HGB BLD-MCNC: 11.8 G/DL (ref 12–15.9)
HMPV RNA NPH QL NAA+NON-PROBE: NOT DETECTED
HPIV1 RNA ISLT QL NAA+PROBE: NOT DETECTED
HPIV2 RNA SPEC QL NAA+PROBE: NOT DETECTED
HPIV3 RNA NPH QL NAA+PROBE: NOT DETECTED
HPIV4 P GENE NPH QL NAA+PROBE: NOT DETECTED
IMM GRANULOCYTES # BLD AUTO: 0.01 10*3/MM3 (ref 0–0.05)
IMM GRANULOCYTES NFR BLD AUTO: 0.5 % (ref 0–0.5)
LYMPHOCYTES # BLD AUTO: 0.78 10*3/MM3 (ref 0.7–3.1)
LYMPHOCYTES NFR BLD AUTO: 36.1 % (ref 19.6–45.3)
M PNEUMO IGG SER IA-ACNC: NOT DETECTED
MCH RBC QN AUTO: 29 PG (ref 26.6–33)
MCHC RBC AUTO-ENTMCNC: 32.9 G/DL (ref 31.5–35.7)
MCV RBC AUTO: 88.2 FL (ref 79–97)
MONOCYTES # BLD AUTO: 0.26 10*3/MM3 (ref 0.1–0.9)
MONOCYTES NFR BLD AUTO: 12 % (ref 5–12)
NEUTROPHILS NFR BLD AUTO: 1.05 10*3/MM3 (ref 1.7–7)
NEUTROPHILS NFR BLD AUTO: 48.6 % (ref 42.7–76)
NRBC BLD AUTO-RTO: 0 /100 WBC (ref 0–0.2)
PLATELET # BLD AUTO: 171 10*3/MM3 (ref 140–450)
PMV BLD AUTO: 11.6 FL (ref 6–12)
POTASSIUM SERPL-SCNC: 3.4 MMOL/L (ref 3.5–5.2)
PROT SERPL-MCNC: 5 G/DL (ref 6–8.5)
RBC # BLD AUTO: 4.07 10*6/MM3 (ref 3.77–5.28)
RHINOVIRUS RNA SPEC NAA+PROBE: NOT DETECTED
RSV RNA NPH QL NAA+NON-PROBE: NOT DETECTED
SARS-COV-2 RNA NPH QL NAA+NON-PROBE: DETECTED
SODIUM SERPL-SCNC: 144 MMOL/L (ref 136–145)
WBC NRBC COR # BLD: 2.16 10*3/MM3 (ref 3.4–10.8)

## 2022-07-12 PROCEDURE — 25010000002 DEXAMETHASONE SODIUM PHOSPHATE 100 MG/10ML SOLUTION: Performed by: EMERGENCY MEDICINE

## 2022-07-12 PROCEDURE — 25010000002 ONDANSETRON PER 1 MG: Performed by: EMERGENCY MEDICINE

## 2022-07-12 PROCEDURE — 25010000002 KETOROLAC TROMETHAMINE PER 15 MG: Performed by: EMERGENCY MEDICINE

## 2022-07-12 PROCEDURE — 96365 THER/PROPH/DIAG IV INF INIT: CPT

## 2022-07-12 PROCEDURE — 96375 TX/PRO/DX INJ NEW DRUG ADDON: CPT

## 2022-07-12 PROCEDURE — 25010000002 MORPHINE PER 10 MG: Performed by: EMERGENCY MEDICINE

## 2022-07-12 RX ORDER — KETOROLAC TROMETHAMINE 15 MG/ML
15 INJECTION, SOLUTION INTRAMUSCULAR; INTRAVENOUS ONCE
Status: COMPLETED | OUTPATIENT
Start: 2022-07-12 | End: 2022-07-12

## 2022-07-12 RX ORDER — MORPHINE SULFATE 4 MG/ML
4 INJECTION, SOLUTION INTRAMUSCULAR; INTRAVENOUS ONCE
Status: COMPLETED | OUTPATIENT
Start: 2022-07-12 | End: 2022-07-12

## 2022-07-12 RX ORDER — ONDANSETRON 2 MG/ML
4 INJECTION INTRAMUSCULAR; INTRAVENOUS ONCE
Status: COMPLETED | OUTPATIENT
Start: 2022-07-12 | End: 2022-07-12

## 2022-07-12 RX ADMIN — ONDANSETRON 4 MG: 2 INJECTION INTRAMUSCULAR; INTRAVENOUS at 00:50

## 2022-07-12 RX ADMIN — DEXAMETHASONE SODIUM PHOSPHATE 10 MG: 10 INJECTION, SOLUTION INTRAMUSCULAR; INTRAVENOUS at 00:38

## 2022-07-12 RX ADMIN — KETOROLAC TROMETHAMINE 15 MG: 15 INJECTION, SOLUTION INTRAMUSCULAR; INTRAVENOUS at 02:41

## 2022-07-12 RX ADMIN — MORPHINE SULFATE 4 MG: 4 INJECTION, SOLUTION INTRAMUSCULAR; INTRAVENOUS at 00:51

## 2022-07-12 RX ADMIN — SODIUM CHLORIDE 1000 ML: 9 INJECTION, SOLUTION INTRAVENOUS at 00:36

## 2022-07-12 NOTE — ED PROVIDER NOTES
EMERGENCY DEPARTMENT ENCOUNTER    Pt Name: Michelle Concepcion  MRN: [id number]  Pt :   1990  Room Number:    Date of encounter:  2022  PCP: Provider, No Known  ED Provider: Fer Celestin MD    Historian: patient      HPI:  Chief Complaint: headache        Context: Michelle Concepcion is a 32 y.o. female who presents to the ED c/o headache, severe, myalgias.  Voices some concerns over the past few months of some depression, or difficulty concentrating, has had stressors at home as well.  Acute headache is reason for presentation today.      PAST MEDICAL HISTORY  Past Medical History:   Diagnosis Date   • 11 weeks gestation of pregnancy 2020   • Abnormal Pap smear of cervix    • ADD (attention deficit disorder)    • Alcoholism (CMS/HCC)    • Anxiety    • Chlamydia    • Depression    • HPV (human papilloma virus) infection    • Restless leg syndrome     dx @ 6 yrs old         PAST SURGICAL HISTORY  Past Surgical History:   Procedure Laterality Date   • ADENOIDECTOMY     • EAR TUBES           FAMILY HISTORY  Family History   Problem Relation Age of Onset   • Multiple sclerosis Mother    • No Known Problems Father    • Ovarian cancer Other    • Stroke Maternal Grandmother          SOCIAL HISTORY  Social History     Socioeconomic History   • Marital status:    Tobacco Use   • Smoking status: Never Smoker   • Smokeless tobacco: Never Used   Vaping Use   • Vaping Use: Never used   Substance and Sexual Activity   • Alcohol use: Not Currently     Comment: recovering alcoholic   • Drug use: Not Currently   • Sexual activity: Yes     Partners: Male         ALLERGIES  Cat hair extract and Pollen extract        REVIEW OF SYSTEMS  Review of Systems   Constitutional: Negative. No fever, no weakness.   HENT: Negative for sneezing and sore throat.    Respiratory: Positive for cough. Negative for shortness of breath.    Cardiovascular: Negative.  Negative for chest pain.   Gastrointestinal:  Negative.  Negative for abdominal pain.   Genitourinary: Negative.  Negative for difficulty urinating.     All systems reviewwed and negative except as noted in HPI.      PHYSICAL EXAM    I have reviewed the triage vital signs and nursing notes.    ED Triage Vitals [07/11/22 1814]   Temp Heart Rate Resp BP SpO2   98.7 °F (37.1 °C) 102 16 116/81 100 %      Temp src Heart Rate Source Patient Position BP Location FiO2 (%)   Oral Monitor Sitting Left arm --       Physical Exam  GENERAL:   Appears alert, interactive  HENT: Nares patent.  EYES: No scleral icterus.  Pupils are equal  CV: Regular rhythm, regular rate.  RESPIRATORY: Normal effort.  No audible wheezes, rales or rhonchi.  ABDOMEN: Soft, nontender  MUSCULOSKELETAL: No deformities.   NEURO: Alert, moves all extremities, follows commands.  SKIN: Warm, dry, no rash visualized.        LAB RESULTS  Recent Results (from the past 24 hour(s))   Green Top (Gel)    Collection Time: 07/11/22  6:43 PM   Result Value Ref Range    Extra Tube Hold for add-ons.    Lavender Top    Collection Time: 07/11/22  6:43 PM   Result Value Ref Range    Extra Tube hold for add-on    Gold Top - SST    Collection Time: 07/11/22  6:43 PM   Result Value Ref Range    Extra Tube Hold for add-ons.    Gray Top    Collection Time: 07/11/22  6:43 PM   Result Value Ref Range    Extra Tube Hold for add-ons.    Light Blue Top    Collection Time: 07/11/22  6:43 PM   Result Value Ref Range    Extra Tube Hold for add-ons.    Respiratory Panel PCR w/COVID-19(SARS-CoV-2) DEVIN/LAMONT/SOREN/PAD/COR/[facility]AD/HANK In-House, NP Swab in [facility], 3-4 HR TAT - Swab, Nasopharynx    Collection Time: 07/11/22 11:46 PM    Specimen: Nasopharynx; Swab   Result Value Ref Range    ADENOVIRUS, PCR Not Detected Not Detected    Coronavirus 229E Not Detected Not Detected    Coronavirus HKU1 Not Detected Not Detected    Coronavirus NL63 Not Detected Not Detected    Coronavirus OC43 Not Detected Not Detected    COVID19 Detected (C) Not  Detected - Ref. Range    Human Metapneumovirus Not Detected Not Detected    Human Rhinovirus/Enterovirus Not Detected Not Detected    Influenza A PCR Not Detected Not Detected    Influenza B PCR Not Detected Not Detected    Parainfluenza Virus 1 Not Detected Not Detected    Parainfluenza Virus 2 Not Detected Not Detected    Parainfluenza Virus 3 Not Detected Not Detected    Parainfluenza Virus 4 Not Detected Not Detected    RSV, PCR Not Detected Not Detected    Bordetella pertussis pcr Not Detected Not Detected    Bordetella parapertussis PCR Not Detected Not Detected    Chlamydophila pneumoniae PCR Not Detected Not Detected    Mycoplasma pneumo by PCR Not Detected Not Detected   Comprehensive Metabolic Panel    Collection Time: 07/11/22 11:54 PM    Specimen: Blood   Result Value Ref Range    Glucose 101 (H) 65 - 99 mg/dL    BUN 8 6 - 20 mg/dL    Creatinine 0.66 0.57 - 1.00 mg/dL    Sodium 144 136 - 145 mmol/L    Potassium 3.4 (L) 3.5 - 5.2 mmol/L    Chloride 115 (H) 98 - 107 mmol/L    CO2 20.0 (L) 22.0 - 29.0 mmol/L    Calcium 6.7 (L) 8.6 - 10.5 mg/dL    Total Protein 5.0 (L) 6.0 - 8.5 g/dL    Albumin 2.90 (L) 3.50 - 5.20 g/dL    ALT (SGPT) 9 1 - 33 U/L    AST (SGOT) 14 1 - 32 U/L    Alkaline Phosphatase 53 39 - 117 U/L    Total Bilirubin <0.2 0.0 - 1.2 mg/dL    Globulin 2.1 gm/dL    A/G Ratio 1.4 g/dL    BUN/Creatinine Ratio 12.1 7.0 - 25.0    Anion Gap 9.0 5.0 - 15.0 mmol/L    eGFR 119.7 >60.0 mL/min/1.73   Sedimentation Rate    Collection Time: 07/11/22 11:54 PM    Specimen: Blood   Result Value Ref Range    Sed Rate <1 0 - 20 mm/hr   C-reactive Protein    Collection Time: 07/11/22 11:54 PM    Specimen: Blood   Result Value Ref Range    C-Reactive Protein 0.93 (H) 0.00 - 0.50 mg/dL   CBC Auto Differential    Collection Time: 07/11/22 11:54 PM    Specimen: Blood   Result Value Ref Range    WBC 2.16 (L) 3.40 - 10.80 10*3/mm3    RBC 4.07 3.77 - 5.28 10*6/mm3    Hemoglobin 11.8 (L) 12.0 - 15.9 g/dL    Hematocrit  "35.9 34.0 - 46.6 %    MCV 88.2 79.0 - 97.0 fL    MCH 29.0 26.6 - 33.0 pg    MCHC 32.9 31.5 - 35.7 g/dL    RDW 11.7 (L) 12.3 - 15.4 %    RDW-SD 37.7 37.0 - 54.0 fl    MPV 11.6 6.0 - 12.0 fL    Platelets 171 140 - 450 10*3/mm3    Neutrophil % 48.6 42.7 - 76.0 %    Lymphocyte % 36.1 19.6 - 45.3 %    Monocyte % 12.0 5.0 - 12.0 %    Eosinophil % 1.9 0.3 - 6.2 %    Basophil % 0.9 0.0 - 1.5 %    Immature Grans % 0.5 0.0 - 0.5 %    Neutrophils, Absolute 1.05 (L) 1.70 - 7.00 10*3/mm3    Lymphocytes, Absolute 0.78 0.70 - 3.10 10*3/mm3    Monocytes, Absolute 0.26 0.10 - 0.90 10*3/mm3    Eosinophils, Absolute 0.04 0.00 - 0.40 10*3/mm3    Basophils, Absolute 0.02 0.00 - 0.20 10*3/mm3    Immature Grans, Absolute 0.01 0.00 - 0.05 10*3/mm3    nRBC 0.0 0.0 - 0.2 /100 WBC       If labs were ordered, I independently reviewed the results.        RADIOLOGY  CT Head Without Contrast    Result Date: 7/11/2022  CT HEAD WO CONTRAST-  Date of Exam: 7/11/2022 6:50 PM  Indication: HEADAHCE, \"WORST EVER\", NO HX OF MIGRAINE.  Comparison: None available.  Technique:  Without contrast, contiguous axial CT images of the head were obtained from skull base to vertex.  Coronal and sagittal reconstructions were performed.  Automated exposure control and iterative reconstruction methods were used.  FINDINGS No evidence of intracranial hemorrhage, mass, or midline shift. The ventricles appear normal in size for the patient's age. No extra-axial collections identified. The gray white matter differentiation is intact. No air-fluid levels identified within the paranasal sinuses. The extra-axial structures demonstrate no acute process.      No evidence of hemorrhage, mass effect or midline shift. No acute process identified.  This report was finalized on 7/11/2022 6:59 PM by Geneva Carter MD.        I ordered and reviewed the above noted radiographic studies.          PROCEDURES    Procedures    No orders to display       MEDICATIONS GIVEN IN " ER    Medications   sodium chloride 0.9 % flush 10 mL (has no administration in time range)   metoclopramide (REGLAN) injection 5 mg (5 mg Intravenous Not Given 7/12/22 0032)   diphenhydrAMINE (BENADRYL) injection 12.5 mg (12.5 mg Intravenous Not Given 7/12/22 0032)   sodium chloride 0.9 % bolus 1,000 mL (1,000 mL Intravenous New Bag 7/12/22 0036)   dexamethasone (DECADRON) IVPB 10 mg (0 mg Intravenous Stopped 7/12/22 0145)   Morphine sulfate (PF) injection 4 mg (4 mg Intravenous Given 7/12/22 0051)   ondansetron (ZOFRAN) injection 4 mg (4 mg Intravenous Given 7/12/22 0050)         PROGRESS, DATA ANALYSIS, CONSULTS, AND MEDICAL DECISION MAKING    All labs have been independently reviewed by me.  All radiology studies have been reviewed by me and the radiologist dictating the report.   EKG's have been independently viewed and interpreted by me.      Differential diagnoses: Intracranial hemorrhage, meningitis, viral illness      ED Course as of 07/12/22 0213   Tue Jul 12, 2022   0056 WBC(!): 2.16 [TA]      ED Course User Index  [TA] Fer Celestin MD       On reevaluation, there is no clinical findings to indicate acute unstable neurologic condition such as stroke, or bacterial meningitis at this time.  The CBC, findings were reviewed with the patient as were the results of the viral panel, presentation does not match viral cephalgias with COVID-19.  I did review the patient's medication list, and discussed this with the clinical pharmacy here, I did discuss with her also the risk and benefits of antiviral treatment with Paxil did, I do think this would be of some benefit to her.    AS OF 02:13 EDT VITALS:    BP - 116/81  HR - 102  TEMP - 98.7 °F (37.1 °C) (Oral)  O2 SATS - 100%                  DIAGNOSIS  Final diagnoses:   Viral cephalgia   Current mild episode of major depressive disorder without prior episode (HCC)   COVID-19         DISPOSITION  DISCHARGE    Patient discharged in stable  condition.    Reviewed implications of results, diagnosis, meds, responsibility to follow up, warning signs and symptoms of possible worsening, potential complications and reasons to return to ER.    Patient/Family voiced understanding of above instructions.    Discussed plan for discharge, as there is no emergent indication for admission.  Pt/family is agreeable and understands need for follow up and possible repeat testing.  Pt/family is aware that discharge does not mean that nothing is wrong but that it indicates no emergency is currently present that requires admission and they must continue care with follow-up as given below or with a physician of their choice.     FOLLOW-UP  Provider, No Known  [facility] [id number]               Medication List      New Prescriptions    Nirmatrelvir & Ritonavir 20 x 150 MG & 10 x 100MG tablet therapy pack tablet  Commonly known as: PAXLOVID  Take 3 tablets by mouth 2 (Two) Times a Day for 5 days.           Where to Get Your Medications      These medications were sent to [facility] [address] [place] [address] [address] [facility] [phone]  - [phone]   [address] [facility] [address]    Phone: [phone]   · Nirmatrelvir & Ritonavir 20 x 150 MG & 10 x 100MG tablet therapy pack tablet                  Fer Celestin MD  07/12/22 1906

## 2022-07-12 NOTE — DISCHARGE INSTRUCTIONS
Tylenol, over-the-counter, suggested for viral myalgias, stay hydrated, with the antiviral prescribed, discuss your medication list further with your primary care physician, and pharmacy, you should not breast-feed while taking the Paxil that antiviral.

## 2024-11-10 NOTE — TELEPHONE ENCOUNTER
----- Message from MILADY Pineda sent at 10/6/2020  9:39 AM EDT -----  covid not detected   10-Nov-2024